# Patient Record
Sex: MALE | Race: WHITE | Employment: FULL TIME | URBAN - METROPOLITAN AREA
[De-identification: names, ages, dates, MRNs, and addresses within clinical notes are randomized per-mention and may not be internally consistent; named-entity substitution may affect disease eponyms.]

---

## 2018-08-20 ENCOUNTER — OFFICE VISIT (OUTPATIENT)
Dept: ORTHOPEDIC SURGERY | Age: 48
End: 2018-08-20

## 2018-08-20 ENCOUNTER — HOSPITAL ENCOUNTER (OUTPATIENT)
Dept: ENDOSCOPY | Age: 48
Discharge: HOME | End: 2018-08-20
Attending: INTERNAL MEDICINE | Admitting: INTERNAL MEDICINE

## 2018-08-20 VITALS
HEIGHT: 72 IN | HEART RATE: 57 BPM | SYSTOLIC BLOOD PRESSURE: 143 MMHG | DIASTOLIC BLOOD PRESSURE: 70 MMHG | WEIGHT: 252.4 LBS | BODY MASS INDEX: 34.19 KG/M2

## 2018-08-20 DIAGNOSIS — M79.671 PAIN OF RIGHT HEEL: Primary | ICD-10-CM

## 2018-08-20 DIAGNOSIS — M92.61 HAGLUND'S DEFORMITY OF RIGHT HEEL: ICD-10-CM

## 2018-08-20 PROCEDURE — 99203 OFFICE O/P NEW LOW 30 MIN: CPT | Performed by: ORTHOPAEDIC SURGERY

## 2018-08-20 NOTE — ANESTHESIA PRE-OP
Department of Anesthesiology  Preprocedure Note       Name:  Bobby Estrada   Age:  46 y.o.  :  1970                                          MRN:  [id number]         Date:  2018      Surgeon:    Procedure:    Medications prior to admission:   Prior to Admission medications    Medication Sig Start Date End Date Taking? Authorizing Provider   ARMOUR THYROID PO Take by mouth daily    Historical Provider, MD   NONFORMULARY Prolamine iodine daily    Historical Provider, MD   Omega-3 Fatty Acids (FISH OIL PO) Take by mouth daily    Historical Provider, MD   NONFORMULARY livaplex daily    Historical Provider, MD       Current medications:    Current Outpatient Prescriptions   Medication Sig Dispense Refill    ARMOUR THYROID PO Take by mouth daily      NONFORMULARY Prolamine iodine daily      Omega-3 Fatty Acids (FISH OIL PO) Take by mouth daily      NONFORMULARY livaplex daily       No current facility-administered medications for this encounter. Allergies:  No Known Allergies    Problem List:  There is no problem list on file for this patient. Past Medical History:        Diagnosis Date    Absence of kidney     congenital    Thyroid disease     hypothyroid       Past Surgical History:        Procedure Laterality Date    CHOLECYSTECTOMY      EXTERNAL EAR SURGERY      cosmetic    PILONIDAL CYST EXCISION      VASECTOMY         Social History:    Social History   Substance Use Topics    Smoking status: Not on file    Smokeless tobacco: Not on file    Alcohol use Not on file                                Counseling given: Not Answered      Vital Signs (Current): There were no vitals filed for this visit.                                            BP Readings from Last 3 Encounters:   No data found for BP       NPO Status:                                                                                 BMI:   Wt Readings from Last 3 Encounters:   18 250 lb (113.4 kg)     There is no height or weight on file to calculate BMI. Anesthesia Evaluation  Patient summary reviewed and Nursing notes reviewed  Airway: Mallampati: II  TM distance: >3 FB   Neck ROM: full  Mouth opening: > = 3 FB Dental: normal exam         Pulmonary:Negative Pulmonary ROS                              Cardiovascular:  Exercise tolerance: good (>4 METS),                     Neuro/Psych:   Negative Neuro/Psych ROS              GI/Hepatic/Renal:            ROS comment: Patient has one kidney. Endo/Other:    (+) hypothyroidism::., .                 Abdominal:           Vascular: negative vascular ROS. Anesthesia Plan      ASA 2     (Patient has one solitary kidney)  Induction: intravenous. Anesthetic plan and risks discussed with patient. Plan discussed with CRNA.                   Aguila Escobedo MD   8/20/2018

## 2018-08-27 PROBLEM — M92.61 HAGLUND'S DEFORMITY OF RIGHT HEEL: Status: ACTIVE | Noted: 2018-08-27

## 2018-08-27 NOTE — PROGRESS NOTES
Chief Complaint    Right Heel Pain (Right posterior heel pain x2 months, no trauma. Pain is worse at rest, Achilles feels stiff in the morning. )      History of Present Illness:  Lisa Smith is a 46 y.o. male Here for evaluation of posterior right heel pain. Is been going on for about 2 months without significant improvement. Current pain level is as described below. His primary care physician is Dr. Aramis Frank. He notices stiffness in the Achilles in the morning and problems especially when he is driving. He's tried rest, ice and activity modification as well as oral nonsteroidal anti-inflammatories over-the-counter with limited benefit. No direct injury that he can recall. No significant change in shoewear or activity. Pain Assessment  Location of Pain: Ankle  Location Modifiers: Right, Posterior  Severity of Pain: 7  Quality of Pain: Other (Comment) (Burning)  Duration of Pain: A few minutes  Frequency of Pain: Several days a week  Date Pain First Started:  (2 months ago)  Aggravating Factors:  (plantar flexion)  Relieving Factors: Rest  Result of Injury: No  Work-Related Injury: No  Are there other pain locations you wish to document?: No    Medical History:  Current Outpatient Prescriptions   Medication Sig Dispense Refill    ARMOUR THYROID PO Take by mouth daily      NONFORMULARY Prolamine iodine daily      Omega-3 Fatty Acids (FISH OIL PO) Take by mouth daily      NONFORMULARY livaplex daily       No current facility-administered medications for this visit. Past Medical History:   Diagnosis Date    Absence of kidney     congenital    Thyroid disease     hypothyroid     Past Surgical History:   Procedure Laterality Date    CHOLECYSTECTOMY      EXTERNAL EAR SURGERY      cosmetic    PILONIDAL CYST EXCISION      VASECTOMY       allergies, social and family histories were reviewed and updated as appropriate.     Review of Systems:  Relevant review of systems reviewed and available in the patient's chart    Vital Signs:  BP (!) 143/70   Pulse 57   Ht 6' (1.829 m)   Wt 252 lb 6.4 oz (114.5 kg)   BMI 34.23 kg/m²     General Exam:   Constitutional: Patient is adequately groomed with no evidence of malnutrition, Class I obesity noted. Mental Status: The patient is oriented to time, place and person. The patient's mood and affect are appropriate. Lymphatic: The lymphatic examination bilaterally reveals all areas to be without enlargement or induration. Vascular: Examination reveals no swelling or calf tenderness. Peripheral pulses are palpable and 2+. Neurological: The patient has good coordination. There is no focal weakness or sensory deficit. Right Foot Examination:    Inspection:  There is a bump on the Achilles insertion consistent with a Ginna's type deformity. No significant ulcerations or callus formation of the skin. Hindfoot shows neutral alignment. Ankle shows no swelling at the joint. Palpation:  There is tenderness over the Achilles insertion. No significant tenderness over the plantar fascia. No tenderness at the ankle joint. Range of Motion:  He was achieve active dorsiflexion to 10°, plantarflexion 35°. Strength:  Good strength on plantarflexion and dorsiflexion. Special Tests:  No significant pain with subtalar motion. Sensation light touch grossly present. No pain with squeeze of the calcaneus. Skin: There are no rashes, ulcerations or lesions. Gait: Slight alteration in stride pattern favoring his right side. Radiology:     X-rays obtained and reviewed in office:  Views 2 views right calcaneus  Impression No evidence for acute fracture subluxation. Bone spurring and osteophyte formation at the Achilles insertion consistent with Ginna's deformity. No lytic lesions. Good hindfoot alignment and subtalar joint space. No significant flattening of longitudinal arch on standing lateral view.     Impression:  Encounter Diagnoses   Name Primary?  Pain of right heel Yes    Ginna's deformity of right heel        Office Procedures:  Orders Placed This Encounter   Procedures    XR CALCANEUS RIGHT (MIN 2 VIEWS)    [facility]     Referral Priority:   Routine     Referral Type:   Eval and Treat     Referral Reason:   Specialty Services Required     Requested Specialty:   Physical Therapy     Number of Visits Requested:   1       Treatment Plan:  We discussed the natural history of his diagnosis as well as treatment options. He will trial topical combination anti-inflammatory cream as well as some physical therapy to help regain flexibility as well as eccentric strengthening exercises. He does not seem to believe that his shoes are and irritating part of this issue and should not need to address changing his foot wear. He has been using heel cups intermittently. He can follow-up in 6 weeks' time to check his progress. I did inform him that surgical options do exist for this, but I would refer him onto our foot and ankle specialist, Dr. Faith Watt for any surgical intervention. He understands and accepts this course of care.